# Patient Record
Sex: MALE | Race: BLACK OR AFRICAN AMERICAN | NOT HISPANIC OR LATINO | Employment: FULL TIME | URBAN - METROPOLITAN AREA
[De-identification: names, ages, dates, MRNs, and addresses within clinical notes are randomized per-mention and may not be internally consistent; named-entity substitution may affect disease eponyms.]

---

## 2023-07-21 ENCOUNTER — OFFICE VISIT (OUTPATIENT)
Dept: URGENT CARE | Facility: CLINIC | Age: 63
End: 2023-07-21
Payer: COMMERCIAL

## 2023-07-21 VITALS
HEIGHT: 68 IN | TEMPERATURE: 99 F | OXYGEN SATURATION: 96 % | HEART RATE: 91 BPM | SYSTOLIC BLOOD PRESSURE: 128 MMHG | BODY MASS INDEX: 46.98 KG/M2 | DIASTOLIC BLOOD PRESSURE: 70 MMHG | RESPIRATION RATE: 20 BRPM | WEIGHT: 310 LBS

## 2023-07-21 DIAGNOSIS — E11.9 TYPE 2 DIABETES MELLITUS WITHOUT COMPLICATION, WITHOUT LONG-TERM CURRENT USE OF INSULIN: ICD-10-CM

## 2023-07-21 DIAGNOSIS — R30.0 DYSURIA: ICD-10-CM

## 2023-07-21 DIAGNOSIS — E78.2 MIXED HYPERLIPIDEMIA: ICD-10-CM

## 2023-07-21 DIAGNOSIS — N30.01 ACUTE CYSTITIS WITH HEMATURIA: Primary | ICD-10-CM

## 2023-07-21 DIAGNOSIS — I25.10 CORONARY ARTERY DISEASE INVOLVING NATIVE HEART WITHOUT ANGINA PECTORIS, UNSPECIFIED VESSEL OR LESION TYPE: ICD-10-CM

## 2023-07-21 DIAGNOSIS — N40.0 BENIGN PROSTATIC HYPERPLASIA, UNSPECIFIED WHETHER LOWER URINARY TRACT SYMPTOMS PRESENT: ICD-10-CM

## 2023-07-21 DIAGNOSIS — N41.9 PROSTATITIS, UNSPECIFIED PROSTATITIS TYPE: ICD-10-CM

## 2023-07-21 DIAGNOSIS — C61 PROSTATE CANCER: ICD-10-CM

## 2023-07-21 LAB
BILIRUB UR QL STRIP: NEGATIVE
GLUCOSE UR QL STRIP: NEGATIVE
KETONES UR QL STRIP: NEGATIVE
LEUKOCYTE ESTERASE UR QL STRIP: POSITIVE
PH, POC UA: 5.5
POC BLOOD, URINE: POSITIVE
POC NITRATES, URINE: NEGATIVE
PROT UR QL STRIP: POSITIVE
SP GR UR STRIP: 1.02 (ref 1–1.03)
UROBILINOGEN UR STRIP-ACNC: ABNORMAL (ref 0.3–2.2)

## 2023-07-21 PROCEDURE — 87186 SC STD MICRODIL/AGAR DIL: CPT | Performed by: PHYSICIAN ASSISTANT

## 2023-07-21 PROCEDURE — 99204 PR OFFICE/OUTPT VISIT, NEW, LEVL IV, 45-59 MIN: ICD-10-PCS | Mod: S$GLB,,, | Performed by: PHYSICIAN ASSISTANT

## 2023-07-21 PROCEDURE — 81003 URINALYSIS AUTO W/O SCOPE: CPT | Mod: QW,S$GLB,, | Performed by: PHYSICIAN ASSISTANT

## 2023-07-21 PROCEDURE — 81003 POCT URINALYSIS, DIPSTICK, AUTOMATED, W/O SCOPE: ICD-10-PCS | Mod: QW,S$GLB,, | Performed by: PHYSICIAN ASSISTANT

## 2023-07-21 PROCEDURE — 87077 CULTURE AEROBIC IDENTIFY: CPT | Performed by: PHYSICIAN ASSISTANT

## 2023-07-21 PROCEDURE — 87088 URINE BACTERIA CULTURE: CPT | Performed by: PHYSICIAN ASSISTANT

## 2023-07-21 PROCEDURE — 99204 OFFICE O/P NEW MOD 45 MIN: CPT | Mod: S$GLB,,, | Performed by: PHYSICIAN ASSISTANT

## 2023-07-21 PROCEDURE — 87086 URINE CULTURE/COLONY COUNT: CPT | Performed by: PHYSICIAN ASSISTANT

## 2023-07-21 RX ORDER — ASPIRIN 81 MG/1
81 TABLET ORAL
COMMUNITY

## 2023-07-21 RX ORDER — METFORMIN HYDROCHLORIDE 500 MG/1
1 TABLET ORAL DAILY
COMMUNITY
Start: 2023-04-17

## 2023-07-21 RX ORDER — SILDENAFIL 100 MG/1
TABLET, FILM COATED ORAL
COMMUNITY
Start: 2023-03-27

## 2023-07-21 RX ORDER — CYANOCOBALAMIN (VITAMIN B-12) 250 MCG
250 TABLET ORAL
COMMUNITY

## 2023-07-21 RX ORDER — RAMIPRIL 5 MG/1
1 CAPSULE ORAL DAILY
COMMUNITY
Start: 2022-11-21

## 2023-07-21 RX ORDER — ATENOLOL 25 MG/1
1 TABLET ORAL NIGHTLY
COMMUNITY
Start: 2023-03-05

## 2023-07-21 RX ORDER — ATORVASTATIN CALCIUM 80 MG/1
80 TABLET, FILM COATED ORAL NIGHTLY
COMMUNITY

## 2023-07-21 RX ORDER — FERROUS SULFATE 325(65) MG
325 TABLET ORAL
COMMUNITY

## 2023-07-21 RX ORDER — SULFAMETHOXAZOLE AND TRIMETHOPRIM 800; 160 MG/1; MG/1
1 TABLET ORAL 2 TIMES DAILY
Qty: 20 TABLET | Refills: 0 | Status: SHIPPED | OUTPATIENT
Start: 2023-07-21 | End: 2023-07-31

## 2023-07-21 RX ORDER — TAMSULOSIN HYDROCHLORIDE 0.4 MG/1
1 CAPSULE ORAL NIGHTLY
COMMUNITY
Start: 2023-05-01

## 2023-07-21 NOTE — PATIENT INSTRUCTIONS
You must understand that you've received an Urgent Care treatment only and that you may be released before all your medical problems are known or treated. You, the patient, will arrange for follow up care as instructed.      Follow up with your PCP or specialty clinic as instructed in the next 2-3 days if not improved or as needed. You can call [phone] to schedule an appointment with appropriate provider.      If you condition worsens, we recommend that you receive another evaluation at the emergency room immediately or contact your primary medical clinic's after hours call service to discuss your concerns.      Please return here or go to the Emergency Department for any concerns or worsening condition.      If you were prescribed a narcotic or controlled substance, do not drive or operate heavy equipment or machinery while taking these medications.

## 2023-07-21 NOTE — PROGRESS NOTES
"Subjective:      Patient ID: Cam Robledo is a 63 y.o. male.    Vitals:  height is 5' 8" (1.727 m) and weight is 140.6 kg (310 lb) (abnormal). His oral temperature is 98.9 °F (37.2 °C). His blood pressure is 128/70 and his pulse is 91. His respiration is 20 and oxygen saturation is 96%.     Chief Complaint: Dysuria    Pt complains of dysuria, hematuria, and back pain that started 2 days ago and has been getting worse. He was just recently diagnosed with prostate cancer.     Patient provider note starts here:  Patient with a newly diagnosed history of prostate cancer presents with complaints of dysuria, hematuria, pelvic and lower back pain for the past 2 days.  Also endorses chills but denies fevers.  Reports intermittent nausea but denies vomiting.  Reports a history of prostatitis in the past and this feels similar.  His urologist is in [place] and he is only traveling here for vacation at this time.  Returns to [place] in the next 5 days.    Dysuria   This is a new problem. Episode onset: 2 days ago. The problem has been gradually worsening. The quality of the pain is described as aching and burning. The pain is at a severity of 10/10. The pain is severe. Associated symptoms include chills, frequency, hematuria, nausea and urgency. Pertinent negatives include no discharge, flank pain (Low back pain), vomiting, constipation or rash. He has tried nothing for the symptoms. His past medical history is significant for hypertension. There is no history of kidney stones or recurrent UTIs.     Constitution: Positive for chills. Negative for fever.   HENT:  Negative for sore throat.    Neck: Negative for neck pain and neck stiffness.   Cardiovascular:  Negative for chest pain.   Respiratory:  Negative for chest tightness, cough and wheezing.    Gastrointestinal:  Positive for nausea. Negative for abdominal pain, vomiting, constipation and diarrhea.   Genitourinary:  Positive for dysuria, frequency, urgency, hematuria " and pelvic pain. Negative for flank pain (Low back pain), penile discharge, penile pain, penile swelling, scrotal swelling and testicular pain.   Musculoskeletal:  Negative for pain.   Skin:  Negative for rash and wound.   Allergic/Immunologic: Negative for itching.   Neurological:  Negative for numbness and tingling.    Objective:     Physical Exam   Constitutional: He is oriented to person, place, and time. He appears well-developed.  Non-toxic appearance. He does not appear ill. No distress. obesity  HENT:   Head: Normocephalic and atraumatic.   Ears:   Right Ear: External ear normal.   Left Ear: External ear normal.   Nose: Nose normal. No nasal deformity. No epistaxis.   Mouth/Throat: Oropharynx is clear and moist and mucous membranes are normal.   Eyes: Conjunctivae and lids are normal.   Neck: Trachea normal and phonation normal. Neck supple.   Cardiovascular: Normal rate, regular rhythm and normal heart sounds.   Pulmonary/Chest: Effort normal and breath sounds normal.   Abdominal: Normal appearance. Soft. There is no abdominal tenderness. There is no left CVA tenderness and no right CVA tenderness.   Musculoskeletal: Normal range of motion.         General: Normal range of motion.   Neurological: He is alert and oriented to person, place, and time. He has normal reflexes.   Skin: Skin is warm, dry, intact and not diaphoretic.   Psychiatric: His speech is normal and behavior is normal. Judgment and thought content normal.   Nursing note and vitals reviewed.    Assessment:     1. Acute cystitis with hematuria    2. Dysuria    3. Type 2 diabetes mellitus without complication, without long-term current use of insulin    4. Mixed hyperlipidemia    5. Benign prostatic hyperplasia, unspecified whether lower urinary tract symptoms present    6. Coronary artery disease involving native heart without angina pectoris, unspecified vessel or lesion type    7. Prostatitis, unspecified prostatitis type    8. Prostate  cancer      Results for orders placed or performed in visit on 07/21/23   POCT Urinalysis, Dipstick, Automated, W/O Scope   Result Value Ref Range    POC Blood, Urine Positive (A) Negative    POC Bilirubin, Urine Negative Negative    POC Urobilinogen, Urine norm 0.3 - 2.2    POC Ketones, Urine Negative Negative    POC Protein, Urine Positive (A) Negative    POC Nitrates, Urine Negative Negative    POC Glucose, Urine Negative Negative    pH, UA 5.5     POC Specific Gravity, Urine 1.025 1.003 - 1.029    POC Leukocytes, Urine Positive (A) Negative       Plan:       Acute cystitis with hematuria  -     sulfamethoxazole-trimethoprim 800-160mg (BACTRIM DS) 800-160 mg Tab; Take 1 tablet by mouth 2 (two) times daily. for 10 days  Dispense: 20 tablet; Refill: 0    Dysuria  -     CULTURE, URINE  -     POCT Urinalysis, Dipstick, Automated, W/O Scope    Type 2 diabetes mellitus without complication, without long-term current use of insulin    Mixed hyperlipidemia    Benign prostatic hyperplasia, unspecified whether lower urinary tract symptoms present    Coronary artery disease involving native heart without angina pectoris, unspecified vessel or lesion type    Prostatitis, unspecified prostatitis type  -     sulfamethoxazole-trimethoprim 800-160mg (BACTRIM DS) 800-160 mg Tab; Take 1 tablet by mouth 2 (two) times daily. for 10 days  Dispense: 20 tablet; Refill: 0    Prostate cancer          Medical Decision Making:   History:   Old Medical Records: I decided to obtain old medical records.  Clinical Tests:   Lab Tests: Ordered and Reviewed  Urgent Care Management:  A. Problem List:   -Acute:  Prostatitis, acute cystitis, dysuria   -Chronic:  Prostate cancer, type 2 diabetes, hyperlipidemia, BPH, hypertension  B. Differential diagnosis:  Acute cystitis, nephrolithiasis, prostatitis, pyelonephritis  C. Diagnostic Testing Ordered:  Urinalysis and urine culture  D. Diagnostic Testing Considered:  None  E. Independent Historians:   None  F. Urgent Care Midlevel Independent Results Interpretation:  Urinalysis positive for blood, protein, leukocytes  G. Radiology:  H. Review of Previous Medical Records:  Past chart reviewed.  Recent PSA 1 month ago 7.3.  MRI of the prostate on 07/12 reveals small PI-RADS 4 right peripheral zone lesion.  I. Home Medications Reviewed  J. Social Determinants of Health considered  K. Medical Decision Making and Disposition:  Patient presents with complaints of urinary like symptoms for the past 2 days.  On exam, he is afebrile and nontoxic in appearance.  Urinalysis shows blood, protein, leukocytes.  Treated with Bactrim at this time for 10 days but advised patient to contact his urologist as he may need an extended course.  P returns home next week.  ED precautions discussed.  He verbalized understanding and agreed with this plan.       Patient Instructions   You must understand that you've received an Urgent Care treatment only and that you may be released before all your medical problems are known or treated. You, the patient, will arrange for follow up care as instructed.      Follow up with your PCP or specialty clinic as instructed in the next 2-3 days if not improved or as needed. You can call [phone] to schedule an appointment with appropriate provider.      If you condition worsens, we recommend that you receive another evaluation at the emergency room immediately or contact your primary medical clinic's after hours call service to discuss your concerns.      Please return here or go to the Emergency Department for any concerns or worsening condition.      If you were prescribed a narcotic or controlled substance, do not drive or operate heavy equipment or machinery while taking these medications.

## 2023-07-24 ENCOUNTER — TELEPHONE (OUTPATIENT)
Dept: URGENT CARE | Facility: CLINIC | Age: 63
End: 2023-07-24
Payer: COMMERCIAL

## 2023-07-24 LAB — BACTERIA UR CULT: ABNORMAL

## 2023-07-25 NOTE — TELEPHONE ENCOUNTER
Discussed patient's cultures with him and that the appropriate antibiotic as prescribed.  He is feeling better.  He has of follow-up this Friday with Urology.  I instructed him to continue that antibiotic Bactrim twice daily for the entire 10 days.